# Patient Record
Sex: FEMALE
[De-identification: names, ages, dates, MRNs, and addresses within clinical notes are randomized per-mention and may not be internally consistent; named-entity substitution may affect disease eponyms.]

---

## 2022-12-20 PROBLEM — Z00.00 ENCOUNTER FOR PREVENTIVE HEALTH EXAMINATION: Status: ACTIVE | Noted: 2022-12-20

## 2022-12-22 ENCOUNTER — APPOINTMENT (OUTPATIENT)
Dept: OTOLARYNGOLOGY | Facility: CLINIC | Age: 47
End: 2022-12-22

## 2022-12-22 VITALS — TEMPERATURE: 97.3 F | BODY MASS INDEX: 22.2 KG/M2 | HEIGHT: 64 IN | WEIGHT: 130 LBS

## 2022-12-22 DIAGNOSIS — H65.20 CHRONIC SEROUS OTITIS MEDIA, UNSPECIFIED EAR: ICD-10-CM

## 2022-12-22 DIAGNOSIS — H90.11 CONDUCTIVE HEARING LOSS, UNILATERAL, RIGHT EAR, WITH UNRESTRICTED HEARING ON THE CONTRALATERAL SIDE: ICD-10-CM

## 2022-12-22 DIAGNOSIS — Z87.09 PERSONAL HISTORY OF OTHER DISEASES OF THE RESPIRATORY SYSTEM: ICD-10-CM

## 2022-12-22 DIAGNOSIS — J30.0 VASOMOTOR RHINITIS: ICD-10-CM

## 2022-12-22 DIAGNOSIS — Z86.2 PERSONAL HISTORY OF DISEASES OF THE BLOOD AND BLOOD-FORMING ORGANS AND CERTAIN DISORDERS INVOLVING THE IMMUNE MECHANISM: ICD-10-CM

## 2022-12-22 PROCEDURE — 99203 OFFICE O/P NEW LOW 30 MIN: CPT | Mod: 25

## 2022-12-22 PROCEDURE — 92504 EAR MICROSCOPY EXAMINATION: CPT

## 2022-12-22 PROCEDURE — 31231 NASAL ENDOSCOPY DX: CPT

## 2022-12-22 RX ORDER — FLUTICASONE PROPIONATE 50 UG/1
50 SPRAY, METERED NASAL
Qty: 1 | Refills: 3 | Status: ACTIVE | COMMUNITY
Start: 2022-12-22 | End: 1900-01-01

## 2022-12-22 RX ORDER — METHYLPREDNISOLONE 4 MG/1
4 TABLET ORAL
Qty: 1 | Refills: 0 | Status: ACTIVE | COMMUNITY
Start: 2022-12-22 | End: 1900-01-01

## 2022-12-22 NOTE — HISTORY OF PRESENT ILLNESS
[de-identified] : Katherine Reich has a history of right ear pain and decreased hearing and tinnitus.  This began in August 2022.  Treeated in ED with antibiotics.  Pain resolved and then recurred in the right ear.  Whooshing sound in the right ear. \par No prior ear disease.

## 2022-12-22 NOTE — REVIEW OF SYSTEMS
[Ear Pain] : ear pain [Hearing Loss] : hearing loss [Negative] : Heme/Lymph [de-identified] : Ear Pressure  [de-identified] : Post nasal drip  [FreeTextEntry4] : neck pain

## 2022-12-22 NOTE — PROCEDURE
[FreeTextEntry6] : PROCEDURE:  NASAL ENDOSCOPY  \par \par Surgeon: Dr. Packer\par Indication: Chronic Rhinitis\par Anesthetic: Topical lidocaine and Afrin\par Procedure: The patient was placed in a sitting position.  Following application of the topical anesthetic and decongestant, exam was performed with a flexible endoscope.  The following anatomic sites were directly examined bilaterally in a sequential fashion:\par The scope was introduced in the nasal passage between the middle and inferior turbinates to exam the inferior portion of the middle meatus and the fontanelle, as well as the maxillary ostia. Next, the scope was passed medially and posteriorly to the middle turbinates to examine the sphenoethmoid recess and the superior turbinate region.\par \par Nasopharynx: no masses, choanae patent, no adenoid tissue\par \par The following findings were noted:\par Mild to moderate nasal airway obstruction due to turbinate hypertrophy.

## 2022-12-22 NOTE — DATA REVIEWED
[de-identified] : In light of the patients current symptoms, Complete audiometry was ordered and completed today. I have interpreted these results and reviewed them in detail with the patient.\par \par Low-frequency hearing loss right ear with flat tympanometry

## 2022-12-22 NOTE — ASSESSMENT
[FreeTextEntry1] : Persistent right ear symptoms associated with conductive hearing loss and flat tympanometry.  This appears to be due to middle ear edema and or effusion.  An effusion was not visible on today's examination.  I have discussed this with the patient in detail and carefully reviewed management options.\par \par I have recommended a second trial of oral steroids and steroidal nasal spray.\par \par Follow-up recommended in approximately 1 month.  Myringotomy  to be considered if symptoms and findings persist